# Patient Record
Sex: FEMALE | Race: WHITE | HISPANIC OR LATINO | Employment: FULL TIME | URBAN - NONMETROPOLITAN AREA
[De-identification: names, ages, dates, MRNs, and addresses within clinical notes are randomized per-mention and may not be internally consistent; named-entity substitution may affect disease eponyms.]

---

## 2021-07-09 ENCOUNTER — OFFICE VISIT (OUTPATIENT)
Dept: URGENT CARE | Facility: PHYSICIAN GROUP | Age: 46
End: 2021-07-09

## 2021-07-09 VITALS
RESPIRATION RATE: 16 BRPM | OXYGEN SATURATION: 96 % | DIASTOLIC BLOOD PRESSURE: 84 MMHG | HEIGHT: 60 IN | BODY MASS INDEX: 21.2 KG/M2 | WEIGHT: 108 LBS | SYSTOLIC BLOOD PRESSURE: 126 MMHG | TEMPERATURE: 98.4 F | HEART RATE: 92 BPM

## 2021-07-09 DIAGNOSIS — R06.02 SOB (SHORTNESS OF BREATH): ICD-10-CM

## 2021-07-09 PROCEDURE — 99203 OFFICE O/P NEW LOW 30 MIN: CPT | Performed by: FAMILY MEDICINE

## 2021-07-09 RX ORDER — IBUPROFEN 600 MG/1
TABLET ORAL
COMMUNITY
Start: 2021-06-29 | End: 2021-07-09

## 2021-07-09 RX ORDER — SULFAMETHOXAZOLE AND TRIMETHOPRIM 800; 160 MG/1; MG/1
TABLET ORAL
COMMUNITY
Start: 2021-06-29 | End: 2021-07-09

## 2021-07-09 RX ORDER — CEPHALEXIN 500 MG/1
CAPSULE ORAL
COMMUNITY
Start: 2021-06-29 | End: 2021-07-09

## 2021-07-09 RX ORDER — IBUPROFEN 200 MG
800 TABLET ORAL
COMMUNITY
End: 2021-07-09

## 2021-07-09 ASSESSMENT — ENCOUNTER SYMPTOMS
COUGH: 0
SHORTNESS OF BREATH: 1
SORE THROAT: 0
VOMITING: 0
FEVER: 0

## 2021-07-09 NOTE — PROGRESS NOTES
Subjective:     Mirian Monterroso is a 45 y.o. female who presents for Hypertension Follow-up (took her BP this morning and it was high) and Shortness of Breath (hard time to take a deep breath )    HPI  Pt presents for evaluation of an acute problem  Patient feeling more difficult time taking deep breaths since yesterday  Symptoms started around the time of that her father started having health problems and she had to take him to the emergency room  Feels she becomes out of breath with exertion  Has history of asthma and smoking, and concerned that her asthma has been flaring up  Also has history of anxiety and thought this could be contributing  Having some intermittent chest pressure  No wheezing  Has not been sick otherwise, no cough, congestion, fever, rhinorrhea, vomiting  Patient also had elevated blood pressure this morning at 160/108 blood pressure in office today 126/84    Review of Systems   Constitutional: Negative for fever.   HENT: Negative for sore throat.    Respiratory: Positive for shortness of breath. Negative for cough.    Gastrointestinal: Negative for vomiting.   Skin: Negative for rash.     PMH:  has no past medical history on file.  MEDS: No current outpatient medications on file.  ALLERGIES: No Known Allergies  SURGHX: History reviewed. No pertinent surgical history.  SOCHX:  reports that she has been smoking cigarettes. She has been smoking about 0.50 packs per day. She has never used smokeless tobacco. She reports previous alcohol use. She reports previous drug use.     Objective:   /84   Pulse 92   Temp 36.9 °C (98.4 °F)   Resp 16   Ht 1.524 m (5')   Wt 49 kg (108 lb)   SpO2 96%   BMI 21.09 kg/m²     Physical Exam  Constitutional:       General: She is not in acute distress.     Appearance: She is well-developed. She is not diaphoretic.   HENT:      Head: Normocephalic and atraumatic.   Cardiovascular:      Rate and Rhythm: Normal rate and regular rhythm.   Pulmonary:       Effort: Pulmonary effort is normal. No respiratory distress.      Breath sounds: Normal breath sounds. No stridor. No wheezing, rhonchi or rales.   Skin:     General: Skin is warm and dry.      Findings: No erythema.   Neurological:      Mental Status: She is alert.       Assessment/Plan:   Assessment    1. SOB (shortness of breath)    Patient with feelings of shortness of breath which he been present since yesterday.  She has no signs of an acute illness, and the lungs are clear bilaterally.  Advised that this does not appear to be simple asthma.  Her oxygen saturation is 96%.  Do not suspect pulmonary etiology.  Patient does have history of anxiety and suspect this is more likely the cause of her symptoms.  Her symptoms did start just after she took her father to the emergency room to be evaluated.  However, her description of having chest pressure intermittently is concerning, and do feel she needs further evaluation for cardiac etiology, even if suspicion is low.  No active chest pain, and BP normotensive in office currently.  Did not recommend outpatient evaluation through cardiology referral, as this matter is more urgent.  Patient agreeable to being seen in emergency room for chest pain rule out.  She was taken to [facility].

## 2025-05-14 ENCOUNTER — TELEPHONE (OUTPATIENT)
Dept: OBGYN | Facility: CLINIC | Age: 50
End: 2025-05-14
Payer: MEDICAID

## 2025-06-23 ENCOUNTER — TELEPHONE (OUTPATIENT)
Dept: OBGYN | Facility: CLINIC | Age: 50
End: 2025-06-23
Payer: MEDICAID

## 2025-06-23 NOTE — TELEPHONE ENCOUNTER
Caller Name: Mirian Monterroso    Call Back Number: [phone]    How would the patient prefer to be contacted with a response: Phone call do NOT leave a detailed message    Pt called to verify if she is able to come into her appointment with  for a consultation for a colposcopy. Pt states she has been on her period for about 6 weeks starting light, going heavy, then going to a spotting again. Currently she states she is spotting and inquired if this would be okay for her visit on 6.25.25. I advised with another MA, Bindu, and informed the patient that this visit will be to establish and consult her on the procedure.  will proceed with the procedure if necessary and able.     Pt given protocol for colpo: advised not to douche, have tampons or have anything in the vagina prior to visit.    Pt verbalized understanding.

## 2025-06-25 ENCOUNTER — GYNECOLOGY VISIT (OUTPATIENT)
Dept: OBGYN | Facility: CLINIC | Age: 50
End: 2025-06-25
Payer: MEDICAID

## 2025-06-25 ENCOUNTER — APPOINTMENT (OUTPATIENT)
Dept: OBGYN | Facility: CLINIC | Age: 50
End: 2025-06-25
Payer: MEDICAID

## 2025-06-25 VITALS — WEIGHT: 120 LBS | SYSTOLIC BLOOD PRESSURE: 140 MMHG | BODY MASS INDEX: 23.44 KG/M2 | DIASTOLIC BLOOD PRESSURE: 80 MMHG

## 2025-06-25 DIAGNOSIS — A63.0 HPV (HUMAN PAPILLOMA VIRUS) ANOGENITAL INFECTION: ICD-10-CM

## 2025-06-25 DIAGNOSIS — R87.810 HUMAN PAPILLOMAVIRUS (HPV) TYPE 18 DNA DETECTED IN CERVICAL SPECIMEN: Primary | ICD-10-CM

## 2025-06-25 PROCEDURE — 99999 PR NO CHARGE: CPT | Performed by: OBSTETRICS & GYNECOLOGY

## 2025-06-25 RX ORDER — AMLODIPINE BESYLATE 10 MG/1
10 TABLET ORAL DAILY
COMMUNITY
Start: 2025-04-07

## 2025-06-25 RX ORDER — LISINOPRIL 40 MG/1
40 TABLET ORAL DAILY
COMMUNITY

## 2025-06-25 NOTE — PROGRESS NOTES
"Pt is seen along with her  as a 48yo female with a hx of recent cervical screen with HPV 18/45+.she notes she on her menses and defers allowing me to look today.limitations of this have been discussed.Did take the opportunity to discuss cervical screen/colpo/poss Bx,ECC,limitations,importance of all this.she has had abn screens in the past but were always \"normal\" on recheck-and she has never had a colpo.screen prior to this recent one was 19 years ago.she has not received HPV vaccine.She does smoke.we discussed that at length and offered meds/counseling.all questions were answered.handouts given.no PE done per pt request.we are to see her back in 2 weeks for a colpo.time 45 min.NO charge-rolling into the colpo visit.  "

## 2025-07-09 ENCOUNTER — APPOINTMENT (OUTPATIENT)
Dept: OBGYN | Facility: CLINIC | Age: 50
End: 2025-07-09
Payer: MEDICAID

## 2025-07-09 ENCOUNTER — HOSPITAL ENCOUNTER (OUTPATIENT)
Facility: MEDICAL CENTER | Age: 50
End: 2025-07-09
Attending: OBSTETRICS & GYNECOLOGY
Payer: MEDICAID

## 2025-07-09 DIAGNOSIS — R87.810 HUMAN PAPILLOMAVIRUS (HPV) TYPE 18 DNA DETECTED IN CERVICAL SPECIMEN: Primary | ICD-10-CM

## 2025-07-09 LAB
POCT INT CON NEG: NEGATIVE
POCT INT CON POS: POSITIVE
POCT URINE PREGNANCY TEST: NEGATIVE

## 2025-07-09 PROCEDURE — 88342 IMHCHEM/IMCYTCHM 1ST ANTB: CPT | Performed by: PATHOLOGY

## 2025-07-09 PROCEDURE — 88305 TISSUE EXAM BY PATHOLOGIST: CPT | Mod: 59 | Performed by: PATHOLOGY

## 2025-07-09 PROCEDURE — 88305 TISSUE EXAM BY PATHOLOGIST: CPT | Mod: 26 | Performed by: PATHOLOGY

## 2025-07-09 PROCEDURE — 81025 URINE PREGNANCY TEST: CPT | Performed by: OBSTETRICS & GYNECOLOGY

## 2025-07-09 PROCEDURE — 57454 BX/CURETT OF CERVIX W/SCOPE: CPT | Performed by: OBSTETRICS & GYNECOLOGY
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
## 2025-07-10 DIAGNOSIS — R87.810 HUMAN PAPILLOMAVIRUS (HPV) TYPE 18 DNA DETECTED IN CERVICAL SPECIMEN: ICD-10-CM

## 2025-07-10 LAB
AMBIGUOUS DTTM AMBI4: NORMAL
PATHOLOGY CONSULT NOTE: NORMAL

## 2025-07-30 ENCOUNTER — OFFICE VISIT (OUTPATIENT)
Dept: OBGYN | Facility: CLINIC | Age: 50
End: 2025-07-30
Payer: MEDICAID

## 2025-07-30 VITALS — DIASTOLIC BLOOD PRESSURE: 74 MMHG | BODY MASS INDEX: 22.85 KG/M2 | SYSTOLIC BLOOD PRESSURE: 130 MMHG | WEIGHT: 117 LBS

## 2025-07-30 DIAGNOSIS — R87.7 LOW GRADE SQUAMOUS INTRAEPITHELIAL LESION (LGSIL) ON BIOPSY OF CERVIX: Primary | ICD-10-CM

## 2025-07-30 NOTE — PROGRESS NOTES
Pt is seen for followup.pt has hx of recent abn screen(HPV18/45) leading to a colpo done 7/9/25 with results Bx-LSIL,ECC-benign.add screens were done and are pending.these findings were discussed at length as was management with pt stating she understands,has no questions and will do a Cotest in 6 mo barring add findings.limitations and alternates discussed and pt states she has no questions and understands.no PE today.all questions answered.pt notes no problems following recent colpo. Time 15-20 min

## 2025-08-06 ENCOUNTER — APPOINTMENT (OUTPATIENT)
Dept: OBGYN | Facility: CLINIC | Age: 50
End: 2025-08-06
Payer: MEDICAID